# Patient Record
Sex: MALE | Race: OTHER
[De-identification: names, ages, dates, MRNs, and addresses within clinical notes are randomized per-mention and may not be internally consistent; named-entity substitution may affect disease eponyms.]

---

## 2016-07-22 VITALS
DIASTOLIC BLOOD PRESSURE: 80 MMHG | SYSTOLIC BLOOD PRESSURE: 157 MMHG | SYSTOLIC BLOOD PRESSURE: 157 MMHG | DIASTOLIC BLOOD PRESSURE: 80 MMHG | SYSTOLIC BLOOD PRESSURE: 157 MMHG | DIASTOLIC BLOOD PRESSURE: 80 MMHG | SYSTOLIC BLOOD PRESSURE: 157 MMHG | DIASTOLIC BLOOD PRESSURE: 80 MMHG

## 2017-04-18 ENCOUNTER — HOSPITAL ENCOUNTER (OUTPATIENT)
Dept: HOSPITAL 63 - DXRADRC | Age: 62
Discharge: HOME | End: 2017-04-18
Attending: GENERAL PRACTICE
Payer: COMMERCIAL

## 2017-04-18 DIAGNOSIS — M25.561: ICD-10-CM

## 2017-04-18 DIAGNOSIS — M25.562: Primary | ICD-10-CM

## 2017-04-18 PROCEDURE — 73562 X-RAY EXAM OF KNEE 3: CPT

## 2017-06-20 ENCOUNTER — HOSPITAL ENCOUNTER (OUTPATIENT)
Dept: HOSPITAL 61 - ECHO | Age: 62
Discharge: HOME | End: 2017-06-20
Attending: INTERNAL MEDICINE
Payer: COMMERCIAL

## 2017-06-20 DIAGNOSIS — E11.9: ICD-10-CM

## 2017-06-20 DIAGNOSIS — R06.09: ICD-10-CM

## 2017-06-20 DIAGNOSIS — R06.02: ICD-10-CM

## 2017-06-20 DIAGNOSIS — I08.2: Primary | ICD-10-CM

## 2017-06-20 DIAGNOSIS — R01.1: ICD-10-CM

## 2017-06-20 DIAGNOSIS — I10: ICD-10-CM

## 2017-06-20 PROCEDURE — A9500 TC99M SESTAMIBI: HCPCS

## 2017-06-20 PROCEDURE — 96376 TX/PRO/DX INJ SAME DRUG ADON: CPT

## 2017-06-20 PROCEDURE — 96374 THER/PROPH/DIAG INJ IV PUSH: CPT

## 2017-06-20 PROCEDURE — 93017 CV STRESS TEST TRACING ONLY: CPT

## 2017-06-20 PROCEDURE — 93306 TTE W/DOPPLER COMPLETE: CPT

## 2017-06-20 PROCEDURE — 78452 HT MUSCLE IMAGE SPECT MULT: CPT

## 2017-06-20 PROCEDURE — 96375 TX/PRO/DX INJ NEW DRUG ADDON: CPT

## 2017-06-20 NOTE — RAD
--------------- APPROVED REPORT --------------





Test Type:          Pharmacological

Stress Nurse/Tech: Nicole Harp R.N.

Test Indications: exertional dyspnea

Cardiac History: Hypertension, Diabetes

Medications:     See Electronic Medical Record

Medical History: See Electronic Medical Record

Resting ECG:     S. srinath

Resting Heart Rate: 58 bpm

Resting Blood Pressure: 150/84mmHg

Pretest Chest Pain: No chest pain



Nurse/Tech Notes

S1S2. lungs sound clear

Consent: The procedure was explained to the patient in lay terms. Informed consent was witnessed. Romel
eout was entered into [employer]. History and Stress Test performed by Nicole Harp R.N.



Pharm. Details

Pharmacologic stress testing was performed using 0.4mg per 5ml of regadenoson given intravenously ove
r 7-10 seconds.



Stress Symptoms

Dyspnea



POST EXERCISE

Reason for Termination: Infusion complete

Max HR: 89 bpm

Max Blood Pressure: 154/78mmHg

Blood Pressure response to exercise: Normal blood pressure response during stress.

Chest Pain: No. 

Arrhythmia: No. 

ST Change: No. 



INTERPRETATION

Stress EKG Conclusion: Baseline EKG showed sinus rhythm.  No ischemic changes at peak stress.  No arr
hythmias.



Imaging Protocol

IMAGE PROTOCOL: Rest Tc-99m/stress Tc-99m 1 day



Rest:            Stress:         Viability:   

Radiopharm.Tc99m MpwdfqvixOg66i Sestamibi

Dose11.3mCi            34mCi            

Duration    15min.           10min.           

Img Date  06/20/2017 06/20/2017      

Inj-Img Mnol25gjz.           60min.           



Rest Admin Site:IV - Left AntecubitalAdministrator:MARCELINO Quezada

Stress Admin Site: IV - Left AntecubitalAdministrator: Mary Lou Kaur, RT (R)(N)



STRESS DATA

End Diast. Vol.217.0mlAv. Heart Rate74.0bpm

End Syst. Vol.93.0mlCO Index BSA0.0L/min

Myocardial Okqz018.0gEject. Zqfkdjhp97.0%



Stress Rates

Pk. Fill Rate2.60EDV/secLVtime Pk. Fill 145.64msec

Pk. Empty Rate3.15ESV/secLVtime Pk. Flkce505.07msec

1/3 Pk. Fill1.59EDV/sec



Stress Scores

Regional WT0.00Summed WT0.00

Regional WM0.00Summed WM4.00



Study quality was good.  Left Ventricular size was Normal at Rest and Stress.

Lung uptake was Normal.  Left Ventricular ejection fraction is 57%.

The rest and stress images show normal perfusion, normal contraction and thickening.



LV Perf. Quant

17 Seg. SSS1.00

17 Seg. SRS0.00

17 Seg. SDS1.00

Stress Defect Extent (% LAD)0.00Rest Defect Extent (% LAD)0.00Rev. Defect Extent (% LAD)0.00

Stress Defect Extent (% LCX) 0.00Rest Defect Extent (% LCX)10.00Rev. Defect Extent (% LCX)0.00

Stress Defect Extent (% RCA)0.00Rest Defect Extent (% RCA)0.00Rev. Defect Extent (% RCA)0.00

Stress Defect Extent (% PAVITHRA)0.40Rest Defect Extent (% PAVITHRA)1.70Rev. Defect Extent (% PAVITHRA)0.40



Conclusion

1. Regadenoson cardioisotope stress test did not show any evidence of ischemia or infarct.

2. Normal left ventricular systolic function with ejection fraction calculated at 57%.

3. Low risk for cardiac events.

## 2017-06-20 NOTE — CARD
--------------- APPROVED REPORT --------------





EXAM: Two-dimensional and M-mode echocardiogram with Doppler and color Doppler.



Other Information 

Quality : GoodHR: 65bpm

Rhythm : NSR



INDICATION

Short of breath, Murmur



RISK FACTORS

Hypertension 

Obesity   



2D DIMENSIONS 

RVDd3.2 (2.9-3.5cm)Left Atrium(2D)4.6 (1.6-4.0cm)

IVSd1.3 (0.7-1.1cm)Aortic Root(2D)3.7 (2.0-3.7cm)

LVDd5.6 (3.9-5.9cm)LVOT Diameter2.4 (1.8-2.4cm)

PWd1.3 (0.7-1.1cm)LVDs3.6 (2.5-4.0cm)

FS (%) 35.7 %SV97.5 ml

LVEF(%)64.6 (>50%)



Aortic Valve

AoV Peak Zhang.164.6cm/sAoV VTI31.7cm

AO Peak GR.10.8mmHgLVOT Peak Zhang.100.0cm/s

AO Mean GR.5mmHgAVA (VMAX)2.80cm2

AI P 1/2 [id number]



Mitral Valve

MV E Grsjpiij236.5cm/sMV E Peak Gr.3mmHg

MV DECEL [id number] A Vmrsccqn72.3cm/s

MV E Mean Gr.1mmHgE/A  Ratio1.4

MV A [id number]



Pulmonary Valve

PV Peak Cfdkfckj984.6cm/s



Tricuspid Valve

TR P. Zjivjfvi237wr/sTR Peak Gr.31mmHg



Pulmonary Vein

S1 Fscingbg03.6cm/sD2 Iyqsjmaz44.1cm/s

PVa [id number]



 LEFT VENTRICLE 

The left ventricle is normal size. There is mild concentric left ventricular hypertrophy. The left ve
ntricular systolic function is normal and the ejection fraction is within normal range. The Ejection 
Fraction is 60-65%. There is normal LV segmental wall motion. The left ventricular diastolic function
 and filling is normal for age.



 RIGHT VENTRICLE 

The right ventricle is normal size. There is normal right ventricular wall thickness. The right ventr
icular systolic function is normal.



 ATRIA 

The left atrium is moderately dilated. The right atrium size is normal. The interatrial septum is int
act with no evidence for an atrial septal defect or patent foramen ovale as noted on 2-D or Doppler i
maging.



 AORTIC VALVE 

The aortic valve is mildly sclerotic. The aortic valve is trileaflet. Doppler and Color Flow revealed
 no significant aortic regurgitation. There is no significant aortic valvular stenosis.



 MITRAL VALVE 

The mitral valve leaflets are mildly thickened. There is no evidence of mitral valve prolapse. There 
is no mitral valve stenosis. Doppler and Color Flow revealed no mitral valve regurgitation noted.



 TRICUSPID VALVE 

Doppler and Color Flow revealed mild tricuspid regurgitation. The pulmonary artery systolic pressure 
is estimated at 34 mmHg.



 PULMONIC VALVE 

Doppler and Color Flow revealed no pulmonic valvular regurgitation. There is no pulmonic valvular manuel
nosis.



 GREAT VESSELS 

The aortic root is normal in size. The ascending aorta is normal in size. The pulmonary artery is nor
mal. The IVC is normal in size and collapses >50% with inspiration.



 PERICARDIAL EFFUSION 

There is no evidence of significant pericardial effusion.



Critical Notification

Critical Value: No



<Conclusion>

The left ventricular systolic function is normal and the ejection fraction is within normal range. Th
e Ejection Fraction is 60-65%.

There is normal LV segmental wall motion.

Doppler and Color Flow revealed mild aortic regurgitation.

## 2017-07-13 ENCOUNTER — HOSPITAL ENCOUNTER (OUTPATIENT)
Dept: HOSPITAL 61 - RAD | Age: 62
Discharge: HOME | End: 2017-07-13
Attending: OTOLARYNGOLOGY
Payer: COMMERCIAL

## 2017-07-13 ENCOUNTER — HOSPITAL ENCOUNTER (OUTPATIENT)
Dept: HOSPITAL 61 - RAD | Age: 62
Discharge: HOME | End: 2017-07-13
Attending: INTERNAL MEDICINE
Payer: COMMERCIAL

## 2017-07-13 DIAGNOSIS — T16.2XXA: ICD-10-CM

## 2017-07-13 DIAGNOSIS — R06.02: Primary | ICD-10-CM

## 2017-07-13 DIAGNOSIS — H90.5: Primary | ICD-10-CM

## 2017-07-13 DIAGNOSIS — M25.78: ICD-10-CM

## 2017-07-13 PROCEDURE — 71020: CPT

## 2017-07-13 PROCEDURE — 70030 X-RAY EYE FOR FOREIGN BODY: CPT

## 2017-07-13 NOTE — RAD
Orbits radiograph 7/13/2017 at 1358 hours



Indication: Foreign body evaluation



Comparison: None available



Technique: 2 views of the skull are provided.



Findings:



There is no radiopaque foreign density. Paranasal sinuses are well aerated.

Dental amalgam appreciated. No acute fracture is identified. Mastoid air cells

are well aerated. Bony orbits are intact.



Impression:



No radiopaque foreign density identified.

## 2017-07-13 NOTE — RAD
Chest reveal some 13 2017 at 1358 hours



Indication: Shortness of breath for 3 months



Comparison: None available



Technique: PA and lateral views of the chest are provided.



Findings:



Cardiomediastinal silhouette is borderline enlarged. No pleural effusions,

pulmonary vascular congestion or pneumothorax. The lungs are clear.



Intermargin osteophytosis noted in the thoracic spine.



Impression: Borderline cardiomegaly without acute airspace disease.

## 2017-07-27 ENCOUNTER — HOSPITAL ENCOUNTER (OUTPATIENT)
Dept: HOSPITAL 61 - CT | Age: 62
Discharge: HOME | End: 2017-07-27
Attending: OTOLARYNGOLOGY
Payer: COMMERCIAL

## 2017-07-27 DIAGNOSIS — H90.42: Primary | ICD-10-CM

## 2017-07-27 PROCEDURE — 70480 CT ORBIT/EAR/FOSSA W/O DYE: CPT

## 2017-07-27 NOTE — RAD
EXAM: Brain and temporal bone CT without contrast.

 

HISTORY: Sensorineural hearing loss.

 

TECHNIQUE: Computed tomographic images of the brain and temporal bones 

were obtained according to a high-resolution protocol without contrast. 

*One or more of the following individualized dose reduction techniques 

were utilized for this examination:  

1. Automated exposure control.  

2. Adjustment of the mA and/or kV according to patient size.  

3. Use of iterative reconstruction technique.

 

COMPARISON: None.

 

FINDINGS: The external auditory canals are widely patent. The tympanic 

membranes and ossicular chains are intact. There is no middle ear 

opacification. There is no dehiscence. The mastoid air cells are clear. 

The internal auditory canals and bony labyrinths are unremarkable.

 

No orbital mass is seen. There is moderate ethmoid sinus mucosal 

thickening. The ostiomeatal units are patent. There is no significant 

nasal septal deviation. There is no hemorrhage. There is no mass effect or

midline shift. There is no hydrocephalus. There are subtle areas of 

hypodensity within the cerebral white matter, likely due to chronic small 

vessel disease. No calvarial lesion is seen.

 

IMPRESSION:

1. No finding to correlate with sensorineural hearing loss. Note is made 

that IAC protocol MRI may be useful if clinically feasible.

2. Moderate ethmoid sinus mucosal thickening.

3. No acute intracranial finding.

 

Electronically signed by: Mary Lou Chau MD (7/27/2017 6:08 PM) [facility]

## 2018-02-22 ENCOUNTER — HOSPITAL ENCOUNTER (OUTPATIENT)
Dept: HOSPITAL 63 - SURG | Age: 63
Discharge: HOME | End: 2018-02-22
Attending: INTERNAL MEDICINE
Payer: COMMERCIAL

## 2018-02-22 VITALS
SYSTOLIC BLOOD PRESSURE: 133 MMHG | DIASTOLIC BLOOD PRESSURE: 81 MMHG | DIASTOLIC BLOOD PRESSURE: 81 MMHG | SYSTOLIC BLOOD PRESSURE: 133 MMHG | SYSTOLIC BLOOD PRESSURE: 133 MMHG | DIASTOLIC BLOOD PRESSURE: 81 MMHG | SYSTOLIC BLOOD PRESSURE: 133 MMHG | DIASTOLIC BLOOD PRESSURE: 81 MMHG | SYSTOLIC BLOOD PRESSURE: 133 MMHG | SYSTOLIC BLOOD PRESSURE: 133 MMHG | DIASTOLIC BLOOD PRESSURE: 81 MMHG | DIASTOLIC BLOOD PRESSURE: 81 MMHG

## 2018-02-22 DIAGNOSIS — Z88.2: ICD-10-CM

## 2018-02-22 DIAGNOSIS — Z83.71: ICD-10-CM

## 2018-02-22 DIAGNOSIS — K64.8: ICD-10-CM

## 2018-02-22 DIAGNOSIS — Z79.899: ICD-10-CM

## 2018-02-22 DIAGNOSIS — Z72.89: ICD-10-CM

## 2018-02-22 DIAGNOSIS — Z88.1: ICD-10-CM

## 2018-02-22 DIAGNOSIS — K57.30: ICD-10-CM

## 2018-02-22 DIAGNOSIS — Z12.11: Primary | ICD-10-CM

## 2018-02-22 PROCEDURE — 82947 ASSAY GLUCOSE BLOOD QUANT: CPT

## 2018-02-22 PROCEDURE — 45378 DIAGNOSTIC COLONOSCOPY: CPT

## 2018-09-20 ENCOUNTER — HOSPITAL ENCOUNTER (OUTPATIENT)
Dept: HOSPITAL 63 - DXRAD | Age: 63
Discharge: HOME | End: 2018-09-20
Attending: GENERAL PRACTICE
Payer: COMMERCIAL

## 2018-09-20 DIAGNOSIS — Z83.71: ICD-10-CM

## 2018-09-20 DIAGNOSIS — Z88.1: ICD-10-CM

## 2018-09-20 DIAGNOSIS — M17.0: Primary | ICD-10-CM

## 2018-09-20 DIAGNOSIS — Z88.2: ICD-10-CM

## 2018-09-20 LAB
ALBUMIN SERPL-MCNC: 3.8 G/DL (ref 3.4–5)
ALBUMIN/GLOB SERPL: 1.2 {RATIO} (ref 1–1.7)
ALP SERPL-CCNC: 78 U/L (ref 46–116)
ALT SERPL-CCNC: 44 U/L (ref 16–63)
ANION GAP SERPL CALC-SCNC: 8 MMOL/L (ref 6–14)
AST SERPL-CCNC: 24 U/L (ref 15–37)
BASOPHILS # BLD AUTO: 0 X10^3/UL (ref 0–0.2)
BASOPHILS NFR BLD: 0 % (ref 0–3)
BILIRUB SERPL-MCNC: 0.6 MG/DL (ref 0.2–1)
BUN/CREAT SERPL: 33 (ref 6–20)
CA-I SERPL ISE-MCNC: 26 MG/DL (ref 8–26)
CALCIUM SERPL-MCNC: 8.7 MG/DL (ref 8.5–10.1)
CHLORIDE SERPL-SCNC: 106 MMOL/L (ref 98–107)
CO2 SERPL-SCNC: 27 MMOL/L (ref 21–32)
CREAT SERPL-MCNC: 0.8 MG/DL (ref 0.7–1.3)
EOSINOPHIL NFR BLD: 0.1 X10^3/UL (ref 0–0.7)
EOSINOPHIL NFR BLD: 1 % (ref 0–3)
ERYTHROCYTE [DISTWIDTH] IN BLOOD BY AUTOMATED COUNT: 13.8 % (ref 11.5–14.5)
GFR SERPLBLD BASED ON 1.73 SQ M-ARVRAT: 97.6 ML/MIN
GLOBULIN SER-MCNC: 3.1 G/DL (ref 2.2–3.8)
GLUCOSE SERPL-MCNC: 152 MG/DL (ref 70–99)
HCT VFR BLD CALC: 43.5 % (ref 39–53)
HGB BLD-MCNC: 14.7 G/DL (ref 13–17.5)
LYMPHOCYTES # BLD: 2.5 X10^3/UL (ref 1–4.8)
LYMPHOCYTES NFR BLD AUTO: 26 % (ref 24–48)
MCH RBC QN AUTO: 30 PG (ref 25–35)
MCHC RBC AUTO-ENTMCNC: 34 G/DL (ref 31–37)
MCV RBC AUTO: 89 FL (ref 79–100)
MONO #: 0.8 X10^3/UL (ref 0–1.1)
MONOCYTES NFR BLD: 8 % (ref 0–9)
NEUT #: 6.1 X10^3UL (ref 1.8–7.7)
NEUTROPHILS NFR BLD AUTO: 65 % (ref 31–73)
PLATELET # BLD AUTO: 204 X10^3/UL (ref 140–400)
POTASSIUM SERPL-SCNC: 3.7 MMOL/L (ref 3.5–5.1)
PROT SERPL-MCNC: 6.9 G/DL (ref 6.4–8.2)
RBC # BLD AUTO: 4.91 X10^6/UL (ref 4.3–5.7)
SODIUM SERPL-SCNC: 141 MMOL/L (ref 136–145)
T3 SERPL-MCNC: 104 NG/DL (ref 71–180)
T4 SERPL-MCNC: 6.7 UG/DL (ref 4.5–12)
TESTOST SERPL-MCNC: 74 NG/DL (ref 264–916)
WBC # BLD AUTO: 9.5 X10^3/UL (ref 4–11)

## 2018-09-20 PROCEDURE — 80053 COMPREHEN METABOLIC PANEL: CPT

## 2018-09-20 PROCEDURE — 73562 X-RAY EXAM OF KNEE 3: CPT

## 2018-09-20 PROCEDURE — 82626 DEHYDROEPIANDROSTERONE: CPT

## 2018-09-20 PROCEDURE — 84403 ASSAY OF TOTAL TESTOSTERONE: CPT

## 2018-09-20 PROCEDURE — 84436 ASSAY OF TOTAL THYROXINE: CPT

## 2018-09-20 PROCEDURE — 85025 COMPLETE CBC W/AUTO DIFF WBC: CPT

## 2018-09-20 PROCEDURE — 84443 ASSAY THYROID STIM HORMONE: CPT

## 2018-09-20 PROCEDURE — 84480 ASSAY TRIIODOTHYRONINE (T3): CPT

## 2018-09-20 PROCEDURE — 82306 VITAMIN D 25 HYDROXY: CPT

## 2018-09-20 PROCEDURE — 83036 HEMOGLOBIN GLYCOSYLATED A1C: CPT

## 2018-09-20 PROCEDURE — 36415 COLL VENOUS BLD VENIPUNCTURE: CPT

## 2018-09-21 LAB — HBA1C MFR BLD: 5.9 % (ref 4.8–5.6)

## 2018-09-22 LAB — DHEA SERPL-MCNC: 36 NG/DL (ref 31–701)

## 2019-05-14 ENCOUNTER — HOSPITAL ENCOUNTER (OUTPATIENT)
Dept: HOSPITAL 63 - US | Age: 64
Discharge: HOME | End: 2019-05-14
Attending: GENERAL PRACTICE
Payer: COMMERCIAL

## 2019-05-14 DIAGNOSIS — N28.1: ICD-10-CM

## 2019-05-14 DIAGNOSIS — R16.0: Primary | ICD-10-CM

## 2019-05-14 PROCEDURE — 76700 US EXAM ABDOM COMPLETE: CPT

## 2019-08-21 ENCOUNTER — HOSPITAL ENCOUNTER (OUTPATIENT)
Dept: HOSPITAL 63 - DXRAD | Age: 64
Discharge: HOME | End: 2019-08-21
Attending: GENERAL PRACTICE
Payer: COMMERCIAL

## 2019-08-21 DIAGNOSIS — M47.814: ICD-10-CM

## 2019-08-21 DIAGNOSIS — Z01.818: Primary | ICD-10-CM

## 2019-08-21 PROCEDURE — 93005 ELECTROCARDIOGRAM TRACING: CPT

## 2019-08-21 PROCEDURE — 71046 X-RAY EXAM CHEST 2 VIEWS: CPT

## 2020-09-29 ENCOUNTER — HOSPITAL ENCOUNTER (OUTPATIENT)
Dept: HOSPITAL 63 - DXRAD | Age: 65
Discharge: HOME | End: 2020-09-29
Attending: PHYSICIAN ASSISTANT
Payer: MEDICARE

## 2020-09-29 DIAGNOSIS — M77.32: ICD-10-CM

## 2020-09-29 DIAGNOSIS — M20.12: ICD-10-CM

## 2020-09-29 DIAGNOSIS — M19.072: Primary | ICD-10-CM

## 2020-09-29 PROCEDURE — 73620 X-RAY EXAM OF FOOT: CPT

## 2020-12-01 ENCOUNTER — HOSPITAL ENCOUNTER (OUTPATIENT)
Dept: HOSPITAL 63 - LAB | Age: 65
End: 2020-12-01
Attending: PHYSICIAN ASSISTANT
Payer: MEDICARE

## 2020-12-01 DIAGNOSIS — R51.9: Primary | ICD-10-CM

## 2020-12-01 DIAGNOSIS — R53.83: ICD-10-CM

## 2020-12-01 DIAGNOSIS — Z20.828: ICD-10-CM

## 2020-12-01 PROCEDURE — U0003 INFECTIOUS AGENT DETECTION BY NUCLEIC ACID (DNA OR RNA); SEVERE ACUTE RESPIRATORY SYNDROME CORONAVIRUS 2 (SARS-COV-2) (CORONAVIRUS DISEASE [COVID-19]), AMPLIFIED PROBE TECHNIQUE, MAKING USE OF HIGH THROUGHPUT TECHNOLOGIES AS DESCRIBED BY CMS-2020-01-R: HCPCS
